# Patient Record
Sex: FEMALE | Race: WHITE | NOT HISPANIC OR LATINO | Employment: FULL TIME | URBAN - METROPOLITAN AREA
[De-identification: names, ages, dates, MRNs, and addresses within clinical notes are randomized per-mention and may not be internally consistent; named-entity substitution may affect disease eponyms.]

---

## 2019-01-08 ENCOUNTER — HOSPITAL ENCOUNTER (EMERGENCY)
Facility: HOSPITAL | Age: 24
Discharge: HOME | End: 2019-01-08
Attending: EMERGENCY MEDICINE
Payer: COMMERCIAL

## 2019-01-08 ENCOUNTER — APPOINTMENT (EMERGENCY)
Dept: RADIOLOGY | Facility: HOSPITAL | Age: 24
End: 2019-01-08
Payer: COMMERCIAL

## 2019-01-08 VITALS
RESPIRATION RATE: 18 BRPM | TEMPERATURE: 98.8 F | OXYGEN SATURATION: 99 % | SYSTOLIC BLOOD PRESSURE: 124 MMHG | DIASTOLIC BLOOD PRESSURE: 79 MMHG | HEART RATE: 72 BPM

## 2019-01-08 DIAGNOSIS — R11.2 NAUSEA & VOMITING: ICD-10-CM

## 2019-01-08 DIAGNOSIS — R10.13 EPIGASTRIC PAIN: Primary | ICD-10-CM

## 2019-01-08 LAB
ALBUMIN SERPL BCP-MCNC: 3.9 G/DL (ref 3.5–5)
ALP SERPL-CCNC: 43 U/L (ref 46–116)
ALT SERPL W P-5'-P-CCNC: 25 U/L (ref 12–78)
ANION GAP SERPL CALCULATED.3IONS-SCNC: 10 MMOL/L (ref 4–13)
AST SERPL W P-5'-P-CCNC: 35 U/L (ref 5–45)
ATRIAL RATE: 60 BPM
BASOPHILS # BLD AUTO: 0.04 THOUSANDS/ΜL (ref 0–0.1)
BASOPHILS NFR BLD AUTO: 1 % (ref 0–1)
BILIRUB SERPL-MCNC: 0.4 MG/DL (ref 0.2–1)
BUN SERPL-MCNC: 10 MG/DL (ref 5–25)
CALCIUM SERPL-MCNC: 8.4 MG/DL (ref 8.3–10.1)
CHLORIDE SERPL-SCNC: 105 MMOL/L (ref 100–108)
CO2 SERPL-SCNC: 24 MMOL/L (ref 21–32)
CREAT SERPL-MCNC: 0.74 MG/DL (ref 0.6–1.3)
DEPRECATED D DIMER PPP: <270 NG/ML (FEU)
EOSINOPHIL # BLD AUTO: 0.04 THOUSAND/ΜL (ref 0–0.61)
EOSINOPHIL NFR BLD AUTO: 1 % (ref 0–6)
ERYTHROCYTE [DISTWIDTH] IN BLOOD BY AUTOMATED COUNT: 12.5 % (ref 11.6–15.1)
GFR SERPL CREATININE-BSD FRML MDRD: 113 ML/MIN/1.73SQ M
GLUCOSE SERPL-MCNC: 111 MG/DL (ref 65–140)
HCT VFR BLD AUTO: 38.3 % (ref 34.8–46.1)
HGB BLD-MCNC: 12.9 G/DL (ref 11.5–15.4)
IMM GRANULOCYTES # BLD AUTO: 0.01 THOUSAND/UL (ref 0–0.2)
IMM GRANULOCYTES NFR BLD AUTO: 0 % (ref 0–2)
LIPASE SERPL-CCNC: 88 U/L (ref 73–393)
LYMPHOCYTES # BLD AUTO: 2.15 THOUSANDS/ΜL (ref 0.6–4.47)
LYMPHOCYTES NFR BLD AUTO: 26 % (ref 14–44)
MCH RBC QN AUTO: 30.3 PG (ref 26.8–34.3)
MCHC RBC AUTO-ENTMCNC: 33.7 G/DL (ref 31.4–37.4)
MCV RBC AUTO: 90 FL (ref 82–98)
MONOCYTES # BLD AUTO: 0.61 THOUSAND/ΜL (ref 0.17–1.22)
MONOCYTES NFR BLD AUTO: 7 % (ref 4–12)
NEUTROPHILS # BLD AUTO: 5.51 THOUSANDS/ΜL (ref 1.85–7.62)
NEUTS SEG NFR BLD AUTO: 65 % (ref 43–75)
NRBC BLD AUTO-RTO: 0 /100 WBCS
P AXIS: -27 DEGREES
PLATELET # BLD AUTO: 330 THOUSANDS/UL (ref 149–390)
PMV BLD AUTO: 9.4 FL (ref 8.9–12.7)
POTASSIUM SERPL-SCNC: 4.8 MMOL/L (ref 3.5–5.3)
PR INTERVAL: 150 MS
PROT SERPL-MCNC: 7.2 G/DL (ref 6.4–8.2)
QRS AXIS: 83 DEGREES
QRSD INTERVAL: 76 MS
QT INTERVAL: 418 MS
QTC INTERVAL: 418 MS
RBC # BLD AUTO: 4.26 MILLION/UL (ref 3.81–5.12)
SODIUM SERPL-SCNC: 139 MMOL/L (ref 136–145)
T WAVE AXIS: 62 DEGREES
TROPONIN I SERPL-MCNC: <0.02 NG/ML
VENTRICULAR RATE: 60 BPM
WBC # BLD AUTO: 8.36 THOUSAND/UL (ref 4.31–10.16)

## 2019-01-08 PROCEDURE — 99285 EMERGENCY DEPT VISIT HI MDM: CPT

## 2019-01-08 PROCEDURE — 85025 COMPLETE CBC W/AUTO DIFF WBC: CPT | Performed by: EMERGENCY MEDICINE

## 2019-01-08 PROCEDURE — 93005 ELECTROCARDIOGRAM TRACING: CPT

## 2019-01-08 PROCEDURE — 93010 ELECTROCARDIOGRAM REPORT: CPT | Performed by: INTERNAL MEDICINE

## 2019-01-08 PROCEDURE — 83690 ASSAY OF LIPASE: CPT | Performed by: EMERGENCY MEDICINE

## 2019-01-08 PROCEDURE — 85379 FIBRIN DEGRADATION QUANT: CPT | Performed by: EMERGENCY MEDICINE

## 2019-01-08 PROCEDURE — 36415 COLL VENOUS BLD VENIPUNCTURE: CPT | Performed by: EMERGENCY MEDICINE

## 2019-01-08 PROCEDURE — 80053 COMPREHEN METABOLIC PANEL: CPT | Performed by: EMERGENCY MEDICINE

## 2019-01-08 PROCEDURE — 71046 X-RAY EXAM CHEST 2 VIEWS: CPT

## 2019-01-08 PROCEDURE — 84484 ASSAY OF TROPONIN QUANT: CPT | Performed by: EMERGENCY MEDICINE

## 2019-01-08 RX ORDER — MAGNESIUM HYDROXIDE/ALUMINUM HYDROXICE/SIMETHICONE 120; 1200; 1200 MG/30ML; MG/30ML; MG/30ML
30 SUSPENSION ORAL ONCE
Status: COMPLETED | OUTPATIENT
Start: 2019-01-08 | End: 2019-01-08

## 2019-01-08 RX ORDER — ONDANSETRON 4 MG/1
4 TABLET, ORALLY DISINTEGRATING ORAL EVERY 6 HOURS PRN
Qty: 10 TABLET | Refills: 0 | Status: SHIPPED | OUTPATIENT
Start: 2019-01-08 | End: 2020-10-28 | Stop reason: SDUPTHER

## 2019-01-08 RX ADMIN — LIDOCAINE HYDROCHLORIDE 10 ML: 20 SOLUTION ORAL; TOPICAL at 13:47

## 2019-01-08 RX ADMIN — ALUMINUM HYDROXIDE, MAGNESIUM HYDROXIDE, AND SIMETHICONE 30 ML: 200; 200; 20 SUSPENSION ORAL at 13:47

## 2019-01-08 NOTE — ED NOTES
Patient transported to [address] [place] [address], [address] [place] [address]  01/08/19 2466

## 2019-01-08 NOTE — DISCHARGE INSTRUCTIONS
Epigastric Pain   WHAT YOU NEED TO KNOW:   Epigastric pain is felt in the middle of the upper abdomen, between the ribs and the bellybutton  The pain may be mild or severe  Pain may spread from or to another part of your body  Epigastric pain may be a sign of a serious health problem that needs to be treated  DISCHARGE INSTRUCTIONS:   Call 911 for any of the following:   · You have any of the following signs of a heart attack:      ¨ Squeezing, pressure, or pain in your chest that lasts longer than 5 minutes or returns    ¨ Discomfort or pain in your back, neck, jaw, stomach, or arm     ¨ Trouble breathing    ¨ Nausea or vomiting    ¨ Lightheadedness or a sudden cold sweat, especially with chest pain or trouble breathing    · You have severe pain that radiates to your jaw or back  Return to the emergency department if:   · You have severe pain that starts suddenly and quickly gets worse  · You cannot have a bowel movement and are vomiting  · You vomit or cough up blood  · You see blood in your urine or bowel movement  · You feel drowsy and your breathing is slower than usual   Contact your healthcare provider if:   · You have a fever or chills  · You have yellowing of your skin or the whites of your eyes  · You vomit often or several times in a row  · You lose weight without trying  · You have symptoms for longer than 2 weeks  · You have questions or concerns about your condition or care  Medicines:   · Medicines  may be given to treat pain or stop vomiting  You may also need medicines to reduce or control stomach acid, or treat an infection  · Take your medicine as directed  Contact your healthcare provider if you think your medicine is not helping or if you have side effects  Tell him of her if you are allergic to any medicine  Keep a list of the medicines, vitamins, and herbs you take  Include the amounts, and when and why you take them   Bring the list or the pill bottles to follow-up visits  Carry your medicine list with you in case of an emergency  Follow up with your healthcare provider as directed:  Write down your questions so you remember to ask them during your visits  Manage your symptoms:   · Keep a record of your symptoms  Include when the pain starts, how long it lasts, and if it is sharp or dull  Also include any foods you ate or activities you did before the pain started  Keep track of anything that helped the pain  · Eat a variety of healthy foods  Healthy foods include fruits, vegetables, whole-grain breads, low-fat dairy products, beans, lean meats, and fish  Ask if you need to be on a special diet  Certain foods may cause your pain, such as alcohol or foods that are high in fat  You may need to eat smaller meals and to eat more often than usual     · Drink liquids as directed  Ask how much liquid to drink each day and which liquids are best for you  Do not have drinks that contain alcohol or caffeine  © 2017 [facility] Information is for End User's use only and may not be sold, redistributed or otherwise used for commercial purposes  All illustrations and images included in CareNotes® are the copyrighted property of [employer]  or [employer]  The above information is an  only  It is not intended as medical advice for individual conditions or treatments  Talk to your doctor, nurse or pharmacist before following any medical regimen to see if it is safe and effective for you

## 2020-03-18 ENCOUNTER — APPOINTMENT (OUTPATIENT)
Dept: RADIOLOGY | Facility: CLINIC | Age: 25
End: 2020-03-18
Payer: OTHER MISCELLANEOUS

## 2020-03-18 ENCOUNTER — TRANSCRIBE ORDERS (OUTPATIENT)
Dept: URGENT CARE | Facility: CLINIC | Age: 25
End: 2020-03-18

## 2020-03-18 DIAGNOSIS — S69.91XA INJURY OF RIGHT THUMB, INITIAL ENCOUNTER: ICD-10-CM

## 2020-03-18 DIAGNOSIS — S69.91XA INJURY OF RIGHT THUMB, INITIAL ENCOUNTER: Primary | ICD-10-CM

## 2020-03-18 PROCEDURE — 73140 X-RAY EXAM OF FINGER(S): CPT

## 2020-03-20 ENCOUNTER — OFFICE VISIT (OUTPATIENT)
Dept: OBGYN CLINIC | Facility: CLINIC | Age: 25
End: 2020-03-20
Payer: OTHER MISCELLANEOUS

## 2020-03-20 VITALS
DIASTOLIC BLOOD PRESSURE: 83 MMHG | HEIGHT: 66 IN | WEIGHT: 130 LBS | SYSTOLIC BLOOD PRESSURE: 117 MMHG | HEART RATE: 105 BPM | BODY MASS INDEX: 20.89 KG/M2

## 2020-03-20 DIAGNOSIS — S67.01XA CRUSHING INJURY OF RIGHT THUMB, INITIAL ENCOUNTER: Primary | ICD-10-CM

## 2020-03-20 PROCEDURE — 99203 OFFICE O/P NEW LOW 30 MIN: CPT | Performed by: ORTHOPAEDIC SURGERY

## 2020-03-20 NOTE — PROGRESS NOTES
Assessment/Plan:  1  Crushing injury of right thumb, initial encounter         Scribe Attestation    I,:   Christina Wood MA am acting as a scribe while in the presence of the attending physician :        I,:   Saint Buff, DO personally performed the services described in this documentation    as scribed in my presence :              I discussed with Yoly Beck that x-rays are negative for any fractures or dislocations  I explained to her she likely crushed the digital nerves during the injury  She may discontinue the use of the finger splint  She was instructed to work on ROM   She may take vitamin B 6 and b 12 to help with nerve regeneration  She may take Advil OTC as needed for pain  She was instructed to avoid any heavy lifting for the next 2 weeks  She may follow up with me as needed  If her pain is not improved in 4 weeks she may call the office for a follow up appointment  Subjective:   Russ Workman is a 30 y o  female who presents to the office today for evaluation of right thumb pain  Patient states she was at work on 3/18/20 when the [place] door closed on her right thumb  Patient notes pain and burning to the tip of her thumb  She also notes mild pain to the base of her thumb  She noted swelling after the injury  She has been using a finger splint  Review of Systems   Constitutional: Negative for chills and fever  HENT: Negative for drooling and sneezing  Eyes: Negative for redness  Respiratory: Negative for cough and wheezing  Gastrointestinal: Negative for nausea and vomiting  Musculoskeletal: Negative for arthralgias, joint swelling and myalgias  Neurological: Negative for weakness and numbness  Psychiatric/Behavioral: Negative for behavioral problems  The patient is not nervous/anxious  History reviewed  No pertinent past medical history  Past Surgical History:   Procedure Laterality Date    CERVICAL SPINE SURGERY         History reviewed   No pertinent family history  Social History     Occupational History    Not on file   Tobacco Use    Smoking status: Former Smoker    Smokeless tobacco: Never Used   Substance and Sexual Activity    Alcohol use: Yes     Comment: socially     Drug use: No    Sexual activity: Not on file         Current Outpatient Medications:     ondansetron (ZOFRAN-ODT) 4 mg disintegrating tablet, Take 1 tablet (4 mg total) by mouth every 6 (six) hours as needed for nausea or vomiting (Patient not taking: Reported on 3/20/2020), Disp: 10 tablet, Rfl: 0    Allergies   Allergen Reactions    Lidocaine Hives       Objective:  Vitals:    03/20/20 1055   BP: 117/83   Pulse: 105       Ortho Exam     Right thumb    NTTP UCL RCL MCP joint  EPL FPL intact  Mild swelling about the pulp  Compartments soft  Brisk capillary refill  S/m intact median, radial, and ulnar nerve     Physical Exam   Constitutional: She is oriented to person, place, and time  She appears well-developed and well-nourished  HENT:   Head: Normocephalic and atraumatic  Eyes: Conjunctivae are normal  Right eye exhibits no discharge  Left eye exhibits no discharge  Neck: Normal range of motion  Neck supple  Cardiovascular: Normal rate and intact distal pulses  Pulmonary/Chest: Effort normal  No respiratory distress  Musculoskeletal:   As noted in HPI   Neurological: She is alert and oriented to person, place, and time  Skin: Skin is warm and dry  Psychiatric: She has a normal mood and affect  Her behavior is normal  Judgment and thought content normal        I have personally reviewed pertinent films in PACS and my interpretation is as follows:X-ray right thumb performed on 3/18/20 demonstrates no fractures or dislocations

## 2020-04-12 ENCOUNTER — TELEPHONE (OUTPATIENT)
Dept: OTHER | Facility: OTHER | Age: 25
End: 2020-04-12

## 2020-04-13 ENCOUNTER — OFFICE VISIT (OUTPATIENT)
Dept: OBGYN CLINIC | Facility: CLINIC | Age: 25
End: 2020-04-13

## 2020-04-13 VITALS
BODY MASS INDEX: 21.69 KG/M2 | HEART RATE: 81 BPM | WEIGHT: 135 LBS | HEIGHT: 66 IN | SYSTOLIC BLOOD PRESSURE: 100 MMHG | DIASTOLIC BLOOD PRESSURE: 64 MMHG

## 2020-04-13 DIAGNOSIS — M76.62 ACHILLES TENDINITIS OF LEFT LOWER EXTREMITY: ICD-10-CM

## 2020-04-13 DIAGNOSIS — M76.72 PERONEAL TENDINITIS, LEFT: ICD-10-CM

## 2020-04-13 DIAGNOSIS — L03.119 CELLULITIS OF ANKLE: Primary | ICD-10-CM

## 2020-04-13 PROCEDURE — 99214 OFFICE O/P EST MOD 30 MIN: CPT | Performed by: ORTHOPAEDIC SURGERY

## 2020-04-13 RX ORDER — SULFAMETHOXAZOLE AND TRIMETHOPRIM 800; 160 MG/1; MG/1
1 TABLET ORAL EVERY 12 HOURS SCHEDULED
Qty: 20 TABLET | Refills: 0 | Status: SHIPPED | OUTPATIENT
Start: 2020-04-13 | End: 2020-04-14 | Stop reason: SINTOL

## 2020-04-13 RX ORDER — IBUPROFEN 600 MG/1
TABLET ORAL EVERY 6 HOURS PRN
COMMUNITY

## 2020-04-14 ENCOUNTER — TELEPHONE (OUTPATIENT)
Dept: OBGYN CLINIC | Facility: CLINIC | Age: 25
End: 2020-04-14

## 2020-04-14 DIAGNOSIS — L03.116 CELLULITIS OF LEFT LOWER EXTREMITY: Primary | ICD-10-CM

## 2020-04-14 RX ORDER — CEPHALEXIN 500 MG/1
500 CAPSULE ORAL EVERY 6 HOURS SCHEDULED
Qty: 28 CAPSULE | Refills: 0 | Status: SHIPPED | OUTPATIENT
Start: 2020-04-14 | End: 2020-04-21

## 2020-04-17 ENCOUNTER — OFFICE VISIT (OUTPATIENT)
Dept: OBGYN CLINIC | Facility: CLINIC | Age: 25
End: 2020-04-17

## 2020-04-17 VITALS — SYSTOLIC BLOOD PRESSURE: 111 MMHG | DIASTOLIC BLOOD PRESSURE: 71 MMHG

## 2020-04-17 DIAGNOSIS — M76.72 PERONEAL TENDINITIS, LEFT: ICD-10-CM

## 2020-04-17 DIAGNOSIS — L03.116 CELLULITIS OF LEFT LOWER EXTREMITY: Primary | ICD-10-CM

## 2020-04-17 DIAGNOSIS — M76.62 ACHILLES TENDINITIS OF LEFT LOWER EXTREMITY: ICD-10-CM

## 2020-04-17 PROCEDURE — 99213 OFFICE O/P EST LOW 20 MIN: CPT | Performed by: ORTHOPAEDIC SURGERY

## 2020-07-15 ENCOUNTER — HOSPITAL ENCOUNTER (EMERGENCY)
Facility: HOSPITAL | Age: 25
Discharge: HOME | End: 2020-07-15
Attending: EMERGENCY MEDICINE | Admitting: EMERGENCY MEDICINE
Payer: COMMERCIAL

## 2020-07-15 VITALS
HEART RATE: 84 BPM | DIASTOLIC BLOOD PRESSURE: 86 MMHG | OXYGEN SATURATION: 99 % | SYSTOLIC BLOOD PRESSURE: 121 MMHG | RESPIRATION RATE: 18 BRPM | TEMPERATURE: 99.4 F

## 2020-07-15 DIAGNOSIS — R51.9 ACUTE NONINTRACTABLE HEADACHE, UNSPECIFIED HEADACHE TYPE: ICD-10-CM

## 2020-07-15 DIAGNOSIS — R50.9 FEVER: Primary | ICD-10-CM

## 2020-07-15 LAB
ALBUMIN SERPL BCP-MCNC: 4.3 G/DL (ref 3.4–4.8)
ALP SERPL-CCNC: 54.7 U/L (ref 35–140)
ALT SERPL W P-5'-P-CCNC: 13 U/L (ref 5–54)
ANION GAP SERPL CALCULATED.3IONS-SCNC: 8 MMOL/L (ref 4–13)
AST SERPL W P-5'-P-CCNC: 16 U/L (ref 15–41)
BASOPHILS # BLD AUTO: 0.07 THOUSANDS/ΜL (ref 0–0.1)
BASOPHILS NFR BLD AUTO: 1 % (ref 0–1)
BILIRUB SERPL-MCNC: 0.28 MG/DL (ref 0.3–1.2)
BILIRUB UR QL STRIP: NEGATIVE
BUN SERPL-MCNC: 9 MG/DL (ref 6–20)
CALCIUM SERPL-MCNC: 9.1 MG/DL (ref 8.4–10.2)
CHLORIDE SERPL-SCNC: 106 MMOL/L (ref 96–108)
CLARITY UR: CLEAR
CO2 SERPL-SCNC: 25 MMOL/L (ref 22–33)
COLOR UR: YELLOW
CREAT SERPL-MCNC: 0.77 MG/DL (ref 0.4–1.1)
EOSINOPHIL # BLD AUTO: 0.11 THOUSAND/ΜL (ref 0–0.61)
EOSINOPHIL NFR BLD AUTO: 1 % (ref 0–6)
ERYTHROCYTE [DISTWIDTH] IN BLOOD BY AUTOMATED COUNT: 12.9 % (ref 11.6–15.1)
EXT PREG TEST URINE: NEGATIVE
EXT. CONTROL ED NAV: NORMAL
GFR SERPL CREATININE-BSD FRML MDRD: 106 ML/MIN/1.73SQ M
GLUCOSE SERPL-MCNC: 107 MG/DL (ref 65–140)
GLUCOSE UR STRIP-MCNC: NEGATIVE MG/DL
HCT VFR BLD AUTO: 41.6 % (ref 34.8–46.1)
HGB BLD-MCNC: 14.8 G/DL (ref 11.5–15.4)
HGB UR QL STRIP.AUTO: NEGATIVE
IMM GRANULOCYTES # BLD AUTO: 0.01 THOUSAND/UL (ref 0–0.2)
IMM GRANULOCYTES NFR BLD AUTO: 0 % (ref 0–2)
KETONES UR STRIP-MCNC: NEGATIVE MG/DL
LEUKOCYTE ESTERASE UR QL STRIP: NEGATIVE
LYMPHOCYTES # BLD AUTO: 2.55 THOUSANDS/ΜL (ref 0.6–4.47)
LYMPHOCYTES NFR BLD AUTO: 26 % (ref 14–44)
MCH RBC QN AUTO: 30.6 PG (ref 26.8–34.3)
MCHC RBC AUTO-ENTMCNC: 35.6 G/DL (ref 31.4–37.4)
MCV RBC AUTO: 86 FL (ref 82–98)
MONOCYTES # BLD AUTO: 0.72 THOUSAND/ΜL (ref 0.17–1.22)
MONOCYTES NFR BLD AUTO: 7 % (ref 4–12)
NEUTROPHILS # BLD AUTO: 6.42 THOUSANDS/ΜL (ref 1.85–7.62)
NEUTS SEG NFR BLD AUTO: 65 % (ref 43–75)
NITRITE UR QL STRIP: NEGATIVE
PH UR STRIP.AUTO: 6.5 [PH]
PLATELET # BLD AUTO: 313 THOUSANDS/UL (ref 149–390)
PMV BLD AUTO: 9.7 FL (ref 8.9–12.7)
POTASSIUM SERPL-SCNC: 3.8 MMOL/L (ref 3.5–5)
PROT SERPL-MCNC: 6.8 G/DL (ref 6.4–8.3)
PROT UR STRIP-MCNC: NEGATIVE MG/DL
RBC # BLD AUTO: 4.83 MILLION/UL (ref 3.81–5.12)
SODIUM SERPL-SCNC: 139 MMOL/L (ref 133–145)
SP GR UR STRIP.AUTO: 1.01 (ref 1–1.03)
UROBILINOGEN UR QL STRIP.AUTO: 0.2 E.U./DL
WBC # BLD AUTO: 9.88 THOUSAND/UL (ref 4.31–10.16)

## 2020-07-15 PROCEDURE — 36415 COLL VENOUS BLD VENIPUNCTURE: CPT | Performed by: PHYSICIAN ASSISTANT

## 2020-07-15 PROCEDURE — 99284 EMERGENCY DEPT VISIT MOD MDM: CPT | Performed by: PHYSICIAN ASSISTANT

## 2020-07-15 PROCEDURE — 80053 COMPREHEN METABOLIC PANEL: CPT | Performed by: PHYSICIAN ASSISTANT

## 2020-07-15 PROCEDURE — 86618 LYME DISEASE ANTIBODY: CPT | Performed by: PHYSICIAN ASSISTANT

## 2020-07-15 PROCEDURE — 99283 EMERGENCY DEPT VISIT LOW MDM: CPT

## 2020-07-15 PROCEDURE — 85025 COMPLETE CBC W/AUTO DIFF WBC: CPT | Performed by: PHYSICIAN ASSISTANT

## 2020-07-15 PROCEDURE — 81003 URINALYSIS AUTO W/O SCOPE: CPT | Performed by: PHYSICIAN ASSISTANT

## 2020-07-15 PROCEDURE — 81025 URINE PREGNANCY TEST: CPT | Performed by: PHYSICIAN ASSISTANT

## 2020-07-15 PROCEDURE — 86308 HETEROPHILE ANTIBODY SCREEN: CPT | Performed by: PHYSICIAN ASSISTANT

## 2020-07-15 RX ORDER — PHENOL 1.4 %
20 AEROSOL, SPRAY (ML) MUCOUS MEMBRANE
COMMUNITY

## 2020-07-15 RX ORDER — BUTALBITAL, ACETAMINOPHEN AND CAFFEINE 50; 325; 40 MG/1; MG/1; MG/1
1 TABLET ORAL EVERY 4 HOURS PRN
Qty: 20 TABLET | Refills: 0 | Status: SHIPPED | OUTPATIENT
Start: 2020-07-15 | End: 2021-07-27

## 2020-07-15 NOTE — ED PROVIDER NOTES
History  Chief Complaint   Patient presents with    Headache     pt c/o headache with fatigue, sob onset Saturday     Patient presents to ED c/o  5 day history of low-grade fever T-max 101+° when symptoms began, but none since, complaining of a headache, generalized fatigue, malaise, 'just not feeling well", patient was tested for COVID few days ago,  results were negative  Patient states she return to her doctor, who recommended that she follow-up in emergency department since covid  test results are negative and she was having symptoms          Prior to Admission Medications   Prescriptions Last Dose Informant Patient Reported? Taking? Melatonin 10 MG TABS 7/14/2020 at Unknown time  Yes Yes   Sig: Take 20 mg by mouth daily at bedtime   ibuprofen (MOTRIN) 600 mg tablet Not Taking at Unknown time  Yes No   Sig: Take by mouth every 6 (six) hours as needed for mild pain   ondansetron (ZOFRAN-ODT) 4 mg disintegrating tablet Not Taking at Unknown time  No No   Sig: Take 1 tablet (4 mg total) by mouth every 6 (six) hours as needed for nausea or vomiting   Patient not taking: Reported on 3/20/2020      Facility-Administered Medications: None       History reviewed  No pertinent past medical history  Past Surgical History:   Procedure Laterality Date    CERVICAL SPINE SURGERY      SHOULDER ARTHROSCOPY Left 2014       No family history on file  I have reviewed and agree with the history as documented  E-Cigarette/Vaping    E-Cigarette Use Current Every Day User      E-Cigarette/Vaping Substances    Nicotine No     THC No     CBD No     Flavoring No     Other No     Unknown No      Social History     Tobacco Use    Smoking status: Current Every Day Smoker    Smokeless tobacco: Never Used   Substance Use Topics    Alcohol use: Yes     Comment: socially     Drug use: Yes     Types: Marijuana       Review of Systems   Constitutional: Positive for chills, fatigue and fever     HENT: Negative for ear pain, hearing loss and sore throat  Eyes: Negative for visual disturbance  Respiratory: Positive for shortness of breath  Negative for cough  Cardiovascular: Negative for chest pain and leg swelling  Gastrointestinal: Negative for abdominal pain and vomiting  Genitourinary: Negative for dysuria and frequency  Musculoskeletal: Negative for arthralgias, joint swelling and myalgias  Skin: Negative for color change, pallor and rash  Neurological: Positive for weakness and headaches  Negative for dizziness, light-headedness and numbness  Psychiatric/Behavioral: Negative for behavioral problems  All other systems reviewed and are negative  Physical Exam  Physical Exam   Constitutional: She is oriented to person, place, and time  She appears well-developed and well-nourished  No distress  HENT:   Head: Normocephalic and atraumatic  Nose: Nose normal    Mouth/Throat: Oropharynx is clear and moist    Eyes: Pupils are equal, round, and reactive to light  Conjunctivae are normal    Neck: Normal range of motion  Cardiovascular: Normal rate, regular rhythm and normal heart sounds  Pulmonary/Chest: Effort normal and breath sounds normal  No respiratory distress  Abdominal: Soft  Bowel sounds are normal  There is no tenderness  Musculoskeletal: Normal range of motion  Lymphadenopathy:     She has no cervical adenopathy  Neurological: She is alert and oriented to person, place, and time  Skin: Skin is warm and dry  Psychiatric: She has a normal mood and affect  Her behavior is normal    Nursing note and vitals reviewed        Vital Signs  ED Triage Vitals [07/15/20 1732]   Temperature Pulse Respirations Blood Pressure SpO2   99 4 °F (37 4 °C) 84 18 121/86 99 %      Temp Source Heart Rate Source Patient Position - Orthostatic VS BP Location FiO2 (%)   Tympanic -- -- -- --      Pain Score       5           Vitals:    07/15/20 1732   BP: 121/86   Pulse: 84         Visual Acuity      ED Medications  Medications - No data to display    Diagnostic Studies  Results Reviewed     Procedure Component Value Units Date/Time    Comprehensive metabolic panel [[id number]]  (Abnormal) Collected:  07/15/20 1820    Lab Status:  Final result Specimen:  Blood from Arm, Left Updated:  07/15/20 1844     Sodium 139 mmol/L      Potassium 3 8 mmol/L      Chloride 106 mmol/L      CO2 25 mmol/L      ANION GAP 8 mmol/L      BUN 9 mg/dL      Creatinine 0 77 mg/dL      Glucose 107 mg/dL      Calcium 9 1 mg/dL      AST 16 U/L      ALT 13 U/L      Alkaline Phosphatase 54 7 U/L      Total Protein 6 8 g/dL      Albumin 4 3 g/dL      Total Bilirubin 0 28 mg/dL      eGFR 106 ml/min/1 73sq m     Narrative:       National Kidney Disease Foundation guidelines for Chronic Kidney Disease (CKD):     Stage 1 with normal or high GFR (GFR > 90 mL/min/1 73 square meters)    Stage 2 Mild CKD (GFR = 60-89 mL/min/1 73 square meters)    Stage 3A Moderate CKD (GFR = 45-59 mL/min/1 73 square meters)    Stage 3B Moderate CKD (GFR = 30-44 mL/min/1 73 square meters)    Stage 4 Severe CKD (GFR = 15-29 mL/min/1 73 square meters)    Stage 5 End Stage CKD (GFR <15 mL/min/1 73 square meters)  Note: GFR calculation is accurate only with a steady state creatinine    UA w Reflex to Microscopic w Reflex to Culture [[id number]]  (Normal) Collected:  07/15/20 1821    Lab Status:  Final result Specimen:  Urine, Clean Catch Updated:  07/15/20 1832     Color, UA Yellow     Clarity, UA Clear     Specific Gravity, UA 1 010     pH, UA 6 5     Leukocytes, UA Negative     Nitrite, UA Negative     Protein, UA Negative mg/dl      Glucose, UA Negative mg/dl      Ketones, UA Negative mg/dl      Urobilinogen, UA 0 2 E U /dl      Bilirubin, UA Negative     Blood, UA Negative    CBC and differential [[id number]]  (Normal) Collected:  07/15/20 1820    Lab Status:  Final result Specimen:  Blood from Arm, Left Updated:  07/15/20 1827     WBC 9 88 Thousand/uL      RBC 4 83 Million/uL      Hemoglobin 14 8 g/dL      Hematocrit 41 6 %      MCV 86 fL      MCH 30 6 pg      MCHC 35 6 g/dL      RDW 12 9 %      MPV 9 7 fL      Platelets 049 Thousands/uL      Neutrophils Relative 65 %      Immat GRANS % 0 %      Lymphocytes Relative 26 %      Monocytes Relative 7 %      Eosinophils Relative 1 %      Basophils Relative 1 %      Neutrophils Absolute 6 42 Thousands/µL      Immature Grans Absolute 0 01 Thousand/uL      Lymphocytes Absolute 2 55 Thousands/µL      Monocytes Absolute 0 72 Thousand/µL      Eosinophils Absolute 0 11 Thousand/µL      Basophils Absolute 0 07 Thousands/µL     POCT pregnancy, urine [[id number]]  (Normal) Resulted:  07/15/20 1826    Lab Status:  Final result Updated:  07/15/20 1826     EXT PREG TEST UR (Ref: Negative) Negative     Control Valid    Mononucleosis screen [[id number]] Collected:  07/15/20 1820    Lab Status: In process Specimen:  Blood from Arm, Left Updated:  07/15/20 1824    Lyme Antibody Profile with reflex to [facility] [[id number]] Collected:  07/15/20 1820    Lab Status: In process Specimen:  Blood from Arm, Left Updated:  07/15/20 1824                 No orders to display              Procedures  Procedures         ED Course                                             MDM  Number of Diagnoses or Management Options  Diagnosis management comments: Offered NSAIDS to pt in the  emergency department, states she didn't want, she'll take at home, has been with little improvement,  will give her prescription for Fioricet  Mono and Lyme test pending will wait for results in order to treat    Patient has good follow-up with her doctor       Amount and/or Complexity of Data Reviewed  Clinical lab tests: reviewed          Disposition  Final diagnoses:   Fever   Acute nonintractable headache, unspecified headache type     Time reflects when diagnosis was documented in both MDM as applicable and the Disposition within this note     Time User Action Codes Description Comment 7/15/2020  7:09 PM Gwynda Soto Add [R50 9] Fever     7/15/2020  7:09 PM Gwynda Soto Add [R51] Acute nonintractable headache, unspecified headache type       ED Disposition     ED Disposition Condition Date/Time Comment    Discharge Stable Wed Jul 15, 2020  7:07 PM Nikita Rojas discharge to home/self care  Follow-up Information     Follow up With Specialties Details Why Lior Coles MD   As needed [facility] [address] [address]  [place] [address]  [phone]            Patient's Medications   Discharge Prescriptions    BUTALBITAL-ACETAMINOPHEN-CAFFEINE (FIORICET,ESGIC) -40 MG PER TABLET    Take 1 tablet by mouth every 4 (four) hours as needed for headaches       Start Date: 7/15/2020 End Date: --       Order Dose: 1 tablet       Quantity: 20 tablet    Refills: 0     No discharge procedures on file      PDMP Review     None          ED Provider  Electronically Signed by           Aidee Thompson PA-C  07/15/20 3986

## 2020-07-15 NOTE — DISCHARGE INSTRUCTIONS
Use Tylenol every 4 hours or Motrin every 6 hours; you can alternate the 2 medications taking something every 3 hours for pain or fever; if no improvement headache you can add Fioricet

## 2020-07-16 LAB — HETEROPH AB SER QL: NEGATIVE

## 2020-07-17 LAB — B BURGDOR IGG+IGM SER-ACNC: <0.91 ISR (ref 0–0.9)

## 2020-07-20 ENCOUNTER — TELEPHONE (OUTPATIENT)
Dept: PHYSICAL THERAPY | Facility: OTHER | Age: 25
End: 2020-07-20

## 2020-07-20 NOTE — TELEPHONE ENCOUNTER
Nurse reached out to discuss form filled out on-line by patient and  Comprehensive spine program offerings  Patient has hx of back sx, but stated she does not have records  Reviewed triage process with her and patient stated she was interested in evaluation to help establish care/move forward with treatment  Nurse had computer issues and asked if she could call right back after fixing  Patient agreed, but stated she was currently at work and to [facility] with contact information for [facility]  Agreed  Laptop issue resolved and nurse called patient w/i 5 minutes  Voice mail/message left for patient to return call to [place] [address] program including our hours of business and phone number  Reminded CB will come from a non- number

## 2020-07-21 ENCOUNTER — TELEPHONE (OUTPATIENT)
Dept: PHYSICAL THERAPY | Facility: OTHER | Age: 25
End: 2020-07-21

## 2020-10-28 ENCOUNTER — HOSPITAL ENCOUNTER (EMERGENCY)
Facility: HOSPITAL | Age: 25
Discharge: HOME | End: 2020-10-28
Attending: EMERGENCY MEDICINE
Payer: COMMERCIAL

## 2020-10-28 VITALS
WEIGHT: 138.23 LBS | RESPIRATION RATE: 18 BRPM | SYSTOLIC BLOOD PRESSURE: 129 MMHG | DIASTOLIC BLOOD PRESSURE: 79 MMHG | TEMPERATURE: 98.4 F | OXYGEN SATURATION: 100 % | HEART RATE: 73 BPM | BODY MASS INDEX: 22.31 KG/M2

## 2020-10-28 DIAGNOSIS — Z20.822 ENCOUNTER FOR LABORATORY TESTING FOR COVID-19 VIRUS: ICD-10-CM

## 2020-10-28 DIAGNOSIS — M79.10 MYALGIA: ICD-10-CM

## 2020-10-28 DIAGNOSIS — R50.9 FEVER: Primary | ICD-10-CM

## 2020-10-28 PROCEDURE — U0003 INFECTIOUS AGENT DETECTION BY NUCLEIC ACID (DNA OR RNA); SEVERE ACUTE RESPIRATORY SYNDROME CORONAVIRUS 2 (SARS-COV-2) (CORONAVIRUS DISEASE [COVID-19]), AMPLIFIED PROBE TECHNIQUE, MAKING USE OF HIGH THROUGHPUT TECHNOLOGIES AS DESCRIBED BY CMS-2020-01-R: HCPCS | Performed by: EMERGENCY MEDICINE

## 2020-10-28 PROCEDURE — 99284 EMERGENCY DEPT VISIT MOD MDM: CPT | Performed by: EMERGENCY MEDICINE

## 2020-10-28 PROCEDURE — 99283 EMERGENCY DEPT VISIT LOW MDM: CPT

## 2020-10-28 RX ORDER — ONDANSETRON 4 MG/1
4 TABLET, ORALLY DISINTEGRATING ORAL EVERY 8 HOURS PRN
Qty: 20 TABLET | Refills: 0 | Status: SHIPPED | OUTPATIENT
Start: 2020-10-28 | End: 2021-07-27

## 2020-10-30 LAB — SARS-COV-2 RNA SPEC QL NAA+PROBE: NOT DETECTED

## 2021-05-11 ENCOUNTER — APPOINTMENT (EMERGENCY)
Dept: RADIOLOGY | Facility: HOSPITAL | Age: 26
End: 2021-05-11
Payer: COMMERCIAL

## 2021-05-11 ENCOUNTER — HOSPITAL ENCOUNTER (EMERGENCY)
Facility: HOSPITAL | Age: 26
Discharge: HOME | End: 2021-05-11
Attending: EMERGENCY MEDICINE | Admitting: EMERGENCY MEDICINE
Payer: COMMERCIAL

## 2021-05-11 VITALS
OXYGEN SATURATION: 99 % | TEMPERATURE: 98.5 F | DIASTOLIC BLOOD PRESSURE: 79 MMHG | RESPIRATION RATE: 14 BRPM | SYSTOLIC BLOOD PRESSURE: 122 MMHG | HEART RATE: 80 BPM

## 2021-05-11 DIAGNOSIS — B34.9 VIRAL ILLNESS: Primary | ICD-10-CM

## 2021-05-11 LAB
ALBUMIN SERPL BCP-MCNC: 4.3 G/DL (ref 3.4–4.8)
ALP SERPL-CCNC: 49.9 U/L (ref 35–140)
ALT SERPL W P-5'-P-CCNC: 12 U/L (ref 5–54)
ANION GAP SERPL CALCULATED.3IONS-SCNC: 7 MMOL/L (ref 4–13)
AST SERPL W P-5'-P-CCNC: 18 U/L (ref 15–41)
BASOPHILS # BLD AUTO: 0.03 THOUSANDS/ΜL (ref 0–0.1)
BASOPHILS NFR BLD AUTO: 0 % (ref 0–1)
BILIRUB SERPL-MCNC: 0.53 MG/DL (ref 0.3–1.2)
BUN SERPL-MCNC: 6 MG/DL (ref 6–20)
CALCIUM SERPL-MCNC: 9 MG/DL (ref 8.4–10.2)
CHLORIDE SERPL-SCNC: 106 MMOL/L (ref 96–108)
CO2 SERPL-SCNC: 27 MMOL/L (ref 22–33)
CREAT SERPL-MCNC: 0.7 MG/DL (ref 0.4–1.1)
EOSINOPHIL # BLD AUTO: 0.04 THOUSAND/ΜL (ref 0–0.61)
EOSINOPHIL NFR BLD AUTO: 0 % (ref 0–6)
ERYTHROCYTE [DISTWIDTH] IN BLOOD BY AUTOMATED COUNT: 12.3 % (ref 11.6–15.1)
GFR SERPL CREATININE-BSD FRML MDRD: 119 ML/MIN/1.73SQ M
GLUCOSE SERPL-MCNC: 92 MG/DL (ref 65–140)
HCT VFR BLD AUTO: 38.7 % (ref 34.8–46.1)
HGB BLD-MCNC: 13.3 G/DL (ref 11.5–15.4)
IMM GRANULOCYTES # BLD AUTO: 0.01 THOUSAND/UL (ref 0–0.2)
IMM GRANULOCYTES NFR BLD AUTO: 0 % (ref 0–2)
LYMPHOCYTES # BLD AUTO: 3.21 THOUSANDS/ΜL (ref 0.6–4.47)
LYMPHOCYTES NFR BLD AUTO: 34 % (ref 14–44)
MCH RBC QN AUTO: 30.5 PG (ref 26.8–34.3)
MCHC RBC AUTO-ENTMCNC: 34.4 G/DL (ref 31.4–37.4)
MCV RBC AUTO: 89 FL (ref 82–98)
MONOCYTES # BLD AUTO: 0.52 THOUSAND/ΜL (ref 0.17–1.22)
MONOCYTES NFR BLD AUTO: 6 % (ref 4–12)
NEUTROPHILS # BLD AUTO: 5.72 THOUSANDS/ΜL (ref 1.85–7.62)
NEUTS SEG NFR BLD AUTO: 60 % (ref 43–75)
PLATELET # BLD AUTO: 283 THOUSANDS/UL (ref 149–390)
PMV BLD AUTO: 9.4 FL (ref 8.9–12.7)
POTASSIUM SERPL-SCNC: 3.8 MMOL/L (ref 3.5–5)
PROT SERPL-MCNC: 6.7 G/DL (ref 6.4–8.3)
RBC # BLD AUTO: 4.36 MILLION/UL (ref 3.81–5.12)
SARS-COV-2 RNA RESP QL NAA+PROBE: NEGATIVE
SODIUM SERPL-SCNC: 140 MMOL/L (ref 133–145)
TSH SERPL DL<=0.05 MIU/L-ACNC: 0.71 UIU/ML (ref 0.34–5.6)
WBC # BLD AUTO: 9.53 THOUSAND/UL (ref 4.31–10.16)

## 2021-05-11 PROCEDURE — 80053 COMPREHEN METABOLIC PANEL: CPT | Performed by: PHYSICIAN ASSISTANT

## 2021-05-11 PROCEDURE — 99284 EMERGENCY DEPT VISIT MOD MDM: CPT

## 2021-05-11 PROCEDURE — 87635 SARS-COV-2 COVID-19 AMP PRB: CPT | Performed by: PHYSICIAN ASSISTANT

## 2021-05-11 PROCEDURE — 85025 COMPLETE CBC W/AUTO DIFF WBC: CPT | Performed by: PHYSICIAN ASSISTANT

## 2021-05-11 PROCEDURE — 36415 COLL VENOUS BLD VENIPUNCTURE: CPT | Performed by: PHYSICIAN ASSISTANT

## 2021-05-11 PROCEDURE — 99284 EMERGENCY DEPT VISIT MOD MDM: CPT | Performed by: PHYSICIAN ASSISTANT

## 2021-05-11 PROCEDURE — 71045 X-RAY EXAM CHEST 1 VIEW: CPT

## 2021-05-11 PROCEDURE — 84443 ASSAY THYROID STIM HORMONE: CPT | Performed by: PHYSICIAN ASSISTANT

## 2021-05-11 RX ORDER — IBUPROFEN 600 MG/1
600 TABLET ORAL ONCE
Status: COMPLETED | OUTPATIENT
Start: 2021-05-11 | End: 2021-05-11

## 2021-05-11 RX ADMIN — IBUPROFEN 600 MG: 600 TABLET, FILM COATED ORAL at 18:17

## 2021-05-11 NOTE — ED PROVIDER NOTES
History  Chief Complaint   Patient presents with    Flu Symptoms     patient having cough, and fevers, was tested for covid at an urgent care, and the specimen was lost  patient also complains of chest tightness      Pt with no PMH, PSH:  Cervical spine surgery, shoulder arthroscopy  Presents to emergency department complaining of few day h/o congestion, dry cough, fevers, today 101, afebrile here in ED, states she took Tylenol PTA, generalized malaise, tired, myalgias, just not feeling well  Pt was tested for COVID at an urgent care, but the specimen was lost          Prior to Admission Medications   Prescriptions Last Dose Informant Patient Reported? Taking? Melatonin 10 MG TABS   Yes No   Sig: Take 20 mg by mouth daily at bedtime   butalbital-acetaminophen-caffeine (FIORICET,ESGIC) -40 mg per tablet   No No   Sig: Take 1 tablet by mouth every 4 (four) hours as needed for headaches   ibuprofen (MOTRIN) 600 mg tablet   Yes No   Sig: Take by mouth every 6 (six) hours as needed for mild pain   ondansetron (ZOFRAN-ODT) 4 mg disintegrating tablet   No No   Sig: Take 1 tablet (4 mg total) by mouth every 8 (eight) hours as needed for nausea or vomiting for up to 5 days      Facility-Administered Medications: None       History reviewed  No pertinent past medical history  Past Surgical History:   Procedure Laterality Date    CERVICAL SPINE SURGERY      SHOULDER ARTHROSCOPY Left 2014       History reviewed  No pertinent family history  I have reviewed and agree with the history as documented      E-Cigarette/Vaping    E-Cigarette Use Current Every Day User      E-Cigarette/Vaping Substances    Nicotine No     THC No     CBD No     Flavoring No     Other No     Unknown No      Social History     Tobacco Use    Smoking status: Current Every Day Smoker    Smokeless tobacco: Never Used   Substance Use Topics    Alcohol use: Yes     Comment: socially     Drug use: Yes     Types: Marijuana       Review of Systems   Constitutional: Positive for chills, fatigue and fever  HENT: Positive for congestion  Negative for hearing loss, mouth sores, sore throat and trouble swallowing  Eyes: Negative for visual disturbance  Respiratory: Positive for cough  Negative for shortness of breath  Cardiovascular: Negative for chest pain and leg swelling  Gastrointestinal: Negative for abdominal pain, diarrhea, nausea and vomiting  Genitourinary: Negative for dysuria, flank pain, frequency and genital sores  Musculoskeletal: Positive for myalgias  Negative for arthralgias and gait problem  Skin: Negative for color change and pallor  Neurological: Positive for weakness and light-headedness  Negative for dizziness and headaches  Psychiatric/Behavioral: Negative for behavioral problems  All other systems reviewed and are negative  Physical Exam  Physical Exam  Vitals signs and nursing note reviewed  Constitutional:       General: She is in acute distress  Appearance: Normal appearance  She is well-developed  HENT:      Head: Normocephalic and atraumatic  Right Ear: External ear normal       Left Ear: External ear normal       Nose: Congestion present  Mouth/Throat:      Mouth: Mucous membranes are moist       Pharynx: Oropharynx is clear  No oropharyngeal exudate or posterior oropharyngeal erythema  Eyes:      Conjunctiva/sclera: Conjunctivae normal    Neck:      Musculoskeletal: Normal range of motion  Cardiovascular:      Rate and Rhythm: Normal rate and regular rhythm  Pulmonary:      Effort: Pulmonary effort is normal  No respiratory distress  Breath sounds: Normal breath sounds  No wheezing  Abdominal:      General: Bowel sounds are normal       Palpations: Abdomen is soft  Tenderness: There is no right CVA tenderness or left CVA tenderness  Musculoskeletal: Normal range of motion  Lymphadenopathy:      Cervical: No cervical adenopathy     Skin:     General: Skin is warm and dry  Capillary Refill: Capillary refill takes less than 2 seconds  Findings: No rash  Neurological:      General: No focal deficit present  Mental Status: She is alert and oriented to person, place, and time  Motor: No weakness  Psychiatric:         Behavior: Behavior normal          Vital Signs  ED Triage Vitals   Temperature Pulse Respirations Blood Pressure SpO2   05/11/21 1743 05/11/21 1743 05/11/21 1743 05/11/21 1743 05/11/21 1743   98 5 °F (36 9 °C) 80 14 122/79 99 %      Temp Source Heart Rate Source Patient Position - Orthostatic VS BP Location FiO2 (%)   05/11/21 1743 05/11/21 1743 05/11/21 1743 05/11/21 1743 --   Oral Monitor Lying Right arm       Pain Score       05/11/21 1817       Worst Possible Pain           Vitals:    05/11/21 1743   BP: 122/79   Pulse: 80   Patient Position - Orthostatic VS: Lying         Visual Acuity      ED Medications  Medications   ibuprofen (MOTRIN) tablet 600 mg (600 mg Oral Given 5/11/21 1817)       Diagnostic Studies  Results Reviewed     Procedure Component Value Units Date/Time    Novel Coronavirus (Covid-19),PCR [facility] - 2 Hour Stat [[id number]]  (Normal) Collected: 05/11/21 1810    Lab Status: Final result Specimen: Nares from Nasopharyngeal Swab Updated: 05/11/21 1916     SARS-CoV-2 Negative    Narrative: The specimen collection materials, transport medium, and/or testing methodology utilized in the production of these test results have been proven to be reliable in a limited validation with an abbreviated program under the Emergency Utilization Authorization provided by the FDA  Testing reported as "Presumptive positive" will be confirmed with secondary testing to ensure result accuracy  Clinical caution and judgement should be used with the interpretation of these results with consideration of the clinical impression and other laboratory testing    Testing reported as "Positive" or "Negative" has been proven to be accurate according to standard laboratory validation requirements  All testing is performed with control materials showing appropriate reactivity at standard intervals  TSH [[id number]]  (Normal) Collected: 05/11/21 1814    Lab Status: Final result Specimen: Blood from Arm, Right Updated: 05/11/21 1903     TSH 3RD GENERATON 0 710 uIU/mL     Narrative:      Patients undergoing fluorescein dye angiography may retain small amounts of fluorescein in the body for 48-72 hours post procedure  Samples containing fluorescein can produce falsely depressed TSH values  If the patient had this procedure,a specimen should be resubmitted post fluorescein clearance        Comprehensive metabolic panel [[id number]] Collected: 05/11/21 1814    Lab Status: Final result Specimen: Blood from Arm, Right Updated: 05/11/21 1849     Sodium 140 mmol/L      Potassium 3 8 mmol/L      Chloride 106 mmol/L      CO2 27 mmol/L      ANION GAP 7 mmol/L      BUN 6 mg/dL      Creatinine 0 70 mg/dL      Glucose 92 mg/dL      Calcium 9 0 mg/dL      AST 18 U/L      ALT 12 U/L      Alkaline Phosphatase 49 9 U/L      Total Protein 6 7 g/dL      Albumin 4 3 g/dL      Total Bilirubin 0 53 mg/dL      eGFR 119 ml/min/1 73sq m     Narrative:      [facility] guidelines for Chronic Kidney Disease (CKD):     Stage 1 with normal or high GFR (GFR > 90 mL/min/1 73 square meters)    Stage 2 Mild CKD (GFR = 60-89 mL/min/1 73 square meters)    Stage 3A Moderate CKD (GFR = 45-59 mL/min/1 73 square meters)    Stage 3B Moderate CKD (GFR = 30-44 mL/min/1 73 square meters)    Stage 4 Severe CKD (GFR = 15-29 mL/min/1 73 square meters)    Stage 5 End Stage CKD (GFR <15 mL/min/1 73 square meters)  Note: GFR calculation is accurate only with a steady state creatinine    CBC and differential [[id number]]  (Normal) Collected: 05/11/21 1814    Lab Status: Final result Specimen: Blood from Arm, Right Updated: 05/11/21 1822     WBC 9 53 Thousand/uL      RBC 4 36 Million/uL      Hemoglobin 13 3 g/dL      Hematocrit 38 7 %      MCV 89 fL      MCH 30 5 pg      MCHC 34 4 g/dL      RDW 12 3 %      MPV 9 4 fL      Platelets 281 Thousands/uL      Neutrophils Relative 60 %      Immat GRANS % 0 %      Lymphocytes Relative 34 %      Monocytes Relative 6 %      Eosinophils Relative 0 %      Basophils Relative 0 %      Neutrophils Absolute 5 72 Thousands/µL      Immature Grans Absolute 0 01 Thousand/uL      Lymphocytes Absolute 3 21 Thousands/µL      Monocytes Absolute 0 52 Thousand/µL      Eosinophils Absolute 0 04 Thousand/µL      Basophils Absolute 0 03 Thousands/µL                  XR chest 1 view portable   ED Interpretation by Debo Khoury PA-C (05/11 1938)   NAD                 Procedures  Procedures         ED Course                             SBIRT 20yo+      Most Recent Value   SBIRT (23 yo +)   In order to provide better care to our patients, we are screening all of our patients for alcohol and drug use  Would it be okay to ask you these screening questions? Unable to answer at this time Filed at: 05/11/2021 1819                    MDM    Disposition  Final diagnoses:   Viral illness     Time reflects when diagnosis was documented in both MDM as applicable and the Disposition within this note     Time User Action Codes Description Comment    5/11/2021  7:54 PM Ariadna Henson Add [B34 9] Viral illness       ED Disposition     ED Disposition Condition Date/Time Comment    Discharge Stable Tue May 11, 2021  7:54 PM Gregg Townsend discharge to home/self care  Follow-up Information     Follow up With Specialties Details Why Karolina Mercado MD   As needed [phone] [phone]            Patient's Medications   Discharge Prescriptions    No medications on file     No discharge procedures on file      PDMP Review     None          ED Provider  Electronically Signed by           Debo Khoury PA-C  05/11/21 1954

## 2021-05-11 NOTE — DISCHARGE INSTRUCTIONS
Use Tylenol every 4 hours or Motrin every 6 hours; you can alternate the 2 medications taking something every 3 hours for pain or fever  If no improvement follow-up with your doctor in next few days

## 2021-07-06 ENCOUNTER — TELEPHONE (OUTPATIENT)
Dept: OBGYN CLINIC | Facility: HOSPITAL | Age: 26
End: 2021-07-06

## 2021-07-06 NOTE — TELEPHONE ENCOUNTER
Patient sees Sushma Morales    Patient is having L Shoulder pain, she had surgery on it 5 yrs ago @ [facility] [address] in [facility], they were sued so they can't get her records, she would like to know if you would see the patient anyway without them?     [id number]  [address]  Patients Jaquelin Johnson - [phone]

## 2021-07-13 ENCOUNTER — HOSPITAL ENCOUNTER (OUTPATIENT)
Dept: MRI IMAGING | Facility: HOSPITAL | Age: 26
Discharge: HOME | End: 2021-07-13
Payer: COMMERCIAL

## 2021-07-13 DIAGNOSIS — M54.12 CERVICAL RADICULITIS: ICD-10-CM

## 2021-07-13 DIAGNOSIS — M25.512 LEFT SHOULDER PAIN, UNSPECIFIED CHRONICITY: ICD-10-CM

## 2021-07-13 PROCEDURE — 72141 MRI NECK SPINE W/O DYE: CPT

## 2021-07-13 PROCEDURE — G1004 CDSM NDSC: HCPCS

## 2021-07-13 PROCEDURE — 73221 MRI JOINT UPR EXTREM W/O DYE: CPT

## 2021-07-27 ENCOUNTER — OFFICE VISIT (OUTPATIENT)
Dept: OBGYN CLINIC | Facility: CLINIC | Age: 26
End: 2021-07-27
Payer: COMMERCIAL

## 2021-07-27 VITALS
HEART RATE: 87 BPM | BODY MASS INDEX: 21.31 KG/M2 | HEIGHT: 66 IN | WEIGHT: 132.6 LBS | SYSTOLIC BLOOD PRESSURE: 100 MMHG | DIASTOLIC BLOOD PRESSURE: 67 MMHG

## 2021-07-27 DIAGNOSIS — M75.82 ROTATOR CUFF TENDONITIS, LEFT: Primary | ICD-10-CM

## 2021-07-27 PROCEDURE — 99214 OFFICE O/P EST MOD 30 MIN: CPT | Performed by: ORTHOPAEDIC SURGERY

## 2021-07-27 RX ORDER — ALBUTEROL SULFATE 90 UG/1
2 AEROSOL, METERED RESPIRATORY (INHALATION) EVERY 6 HOURS PRN
COMMUNITY
Start: 2021-05-10 | End: 2022-05-10

## 2021-07-27 NOTE — PROGRESS NOTES
Assessment/Plan:  1  Rotator cuff tendonitis, left  Ambulatory referral to Physical Therapy     The patient does appear to have rotator cuff tendonitis, but no rotator cuff tear  I do not advise any surgical intervention at this time  I prescribed her physical therapy for her shoulder  We also advised ice and OTC medications as needed for discomfort  She will follow-up in 6 weeks for repeat evaluation  Subjective: ,  Robby Lynn is a 27 y o  female who presents today for evaluation of her left shoulder  She had an arthroscopy of this shoulder back in 2014 with debridement of partial RTC tear and subacromial decompression  We are able to view this operative report today  She notes that she had done well up until a month or so ago, with no inciting event prior to the onset of her symptoms  She notes pain about the lateral shoulder, but also gets pain about the axilla that radiates down to the elbow  Her pain is worse with movement and better with rest  She notes limitations in ROM as well as weakness  She also notes cracking about the shoulder  She did have an MRI of the shoulder prior to seeing us which showed rotator cuff tendonitis and bursitis, but no evidence of RTC tear  Review of Systems   Constitutional: Negative  Negative for chills and fever  HENT: Negative  Negative for ear pain and sore throat  Eyes: Negative  Negative for pain and redness  Respiratory: Negative  Negative for shortness of breath and wheezing  Cardiovascular: Negative for chest pain and palpitations  Gastrointestinal: Negative  Negative for abdominal pain and blood in stool  Endocrine: Negative  Negative for polydipsia and polyuria  Genitourinary: Negative  Negative for difficulty urinating and dysuria  Musculoskeletal:        As noted in HPI   Skin: Negative  Negative for pallor and rash  Neurological: Negative  Negative for dizziness and numbness  Hematological: Negative    Negative for adenopathy  Does not bruise/bleed easily  Psychiatric/Behavioral: Negative  Negative for confusion and suicidal ideas  History reviewed  No pertinent past medical history  Past Surgical History:   Procedure Laterality Date    CERVICAL SPINE SURGERY      SHOULDER ARTHROSCOPY Left 2014       History reviewed  No pertinent family history  Social History     Occupational History    Not on file   Tobacco Use    Smoking status: Current Every Day Smoker    Smokeless tobacco: Never Used   Vaping Use    Vaping Use: Every day   Substance and Sexual Activity    Alcohol use: Yes     Comment: socially     Drug use: Yes     Types: Marijuana    Sexual activity: Not on file         Current Outpatient Medications:     albuterol (PROVENTIL HFA,VENTOLIN HFA) 90 mcg/act inhaler, Inhale 2 puffs every 6 (six) hours as needed, Disp: , Rfl:     ibuprofen (MOTRIN) 600 mg tablet, Take by mouth every 6 (six) hours as needed for mild pain, Disp: , Rfl:     Melatonin 10 MG TABS, Take 20 mg by mouth daily at bedtime, Disp: , Rfl:     Allergies   Allergen Reactions    Aloe Vera [Lidocaine] Hives       Objective:  Vitals:    07/27/21 1004   BP: 100/67   Pulse: 87       Left Shoulder Exam     Tenderness   The patient is experiencing no tenderness  Range of Motion   Active abduction: 120 (with pain)   External rotation: 70   Forward flexion: 150 (with pain)   Internal rotation 0 degrees: T12     Muscle Strength   Abduction: 4/5   Internal rotation: 4/5   External rotation: 4/5     Tests   Drop arm: negative    Other   Erythema: absent  Sensation: normal  Pulse: present             Physical Exam  Constitutional:       General: She is not in acute distress  Appearance: She is well-developed  HENT:      Head: Normocephalic and atraumatic  Eyes:      General: No scleral icterus  Conjunctiva/sclera: Conjunctivae normal    Neck:      Vascular: No JVD  Cardiovascular:      Rate and Rhythm: Normal rate  Pulmonary:      Effort: Pulmonary effort is normal  No respiratory distress  Skin:     General: Skin is warm  Neurological:      Mental Status: She is alert and oriented to person, place, and time  Coordination: Coordination normal          I have personally reviewed pertinent films in PACS and my interpretation is as follows:  MRI left shoulder: RTC tendonitis and bursitis  No RTC tear noted

## 2021-09-09 ENCOUNTER — APPOINTMENT (EMERGENCY)
Dept: RADIOLOGY | Facility: HOSPITAL | Age: 26
End: 2021-09-09
Payer: COMMERCIAL

## 2021-09-09 ENCOUNTER — HOSPITAL ENCOUNTER (EMERGENCY)
Facility: HOSPITAL | Age: 26
Discharge: HOME | End: 2021-09-09
Attending: EMERGENCY MEDICINE | Admitting: EMERGENCY MEDICINE
Payer: COMMERCIAL

## 2021-09-09 VITALS
WEIGHT: 130 LBS | HEART RATE: 63 BPM | RESPIRATION RATE: 20 BRPM | SYSTOLIC BLOOD PRESSURE: 127 MMHG | HEIGHT: 66 IN | DIASTOLIC BLOOD PRESSURE: 75 MMHG | BODY MASS INDEX: 20.89 KG/M2 | TEMPERATURE: 98.6 F | OXYGEN SATURATION: 99 %

## 2021-09-09 DIAGNOSIS — R07.9 CHEST PAIN: Primary | ICD-10-CM

## 2021-09-09 DIAGNOSIS — F41.9 ANXIETY: ICD-10-CM

## 2021-09-09 LAB
ANION GAP SERPL CALCULATED.3IONS-SCNC: 8 MMOL/L (ref 4–13)
BASOPHILS # BLD AUTO: 0.05 THOUSANDS/ΜL (ref 0–0.1)
BASOPHILS NFR BLD AUTO: 1 % (ref 0–1)
BUN SERPL-MCNC: 9 MG/DL (ref 6–20)
CALCIUM SERPL-MCNC: 9 MG/DL (ref 8.4–10.2)
CHLORIDE SERPL-SCNC: 105 MMOL/L (ref 96–108)
CO2 SERPL-SCNC: 26 MMOL/L (ref 22–33)
CREAT SERPL-MCNC: 0.8 MG/DL (ref 0.4–1.1)
EOSINOPHIL # BLD AUTO: 0.1 THOUSAND/ΜL (ref 0–0.61)
EOSINOPHIL NFR BLD AUTO: 1 % (ref 0–6)
ERYTHROCYTE [DISTWIDTH] IN BLOOD BY AUTOMATED COUNT: 12.7 % (ref 11.6–15.1)
GFR SERPL CREATININE-BSD FRML MDRD: 101 ML/MIN/1.73SQ M
GLUCOSE SERPL-MCNC: 104 MG/DL (ref 65–140)
HCG SERPL QL: NEGATIVE
HCT VFR BLD AUTO: 38.8 % (ref 34.8–46.1)
HGB BLD-MCNC: 13.3 G/DL (ref 11.5–15.4)
IMM GRANULOCYTES # BLD AUTO: 0.01 THOUSAND/UL (ref 0–0.2)
IMM GRANULOCYTES NFR BLD AUTO: 0 % (ref 0–2)
LYMPHOCYTES # BLD AUTO: 2.47 THOUSANDS/ΜL (ref 0.6–4.47)
LYMPHOCYTES NFR BLD AUTO: 30 % (ref 14–44)
MCH RBC QN AUTO: 30.5 PG (ref 26.8–34.3)
MCHC RBC AUTO-ENTMCNC: 34.3 G/DL (ref 31.4–37.4)
MCV RBC AUTO: 89 FL (ref 82–98)
MONOCYTES # BLD AUTO: 0.59 THOUSAND/ΜL (ref 0.17–1.22)
MONOCYTES NFR BLD AUTO: 7 % (ref 4–12)
NEUTROPHILS # BLD AUTO: 5.01 THOUSANDS/ΜL (ref 1.85–7.62)
NEUTS SEG NFR BLD AUTO: 61 % (ref 43–75)
PLATELET # BLD AUTO: 307 THOUSANDS/UL (ref 149–390)
PMV BLD AUTO: 9.5 FL (ref 8.9–12.7)
POTASSIUM SERPL-SCNC: 3.9 MMOL/L (ref 3.5–5)
RBC # BLD AUTO: 4.36 MILLION/UL (ref 3.81–5.12)
SODIUM SERPL-SCNC: 139 MMOL/L (ref 133–145)
TROPONIN I SERPL-MCNC: <0.03 NG/ML (ref 0–0.07)
WBC # BLD AUTO: 8.23 THOUSAND/UL (ref 4.31–10.16)

## 2021-09-09 PROCEDURE — 99285 EMERGENCY DEPT VISIT HI MDM: CPT

## 2021-09-09 PROCEDURE — 84703 CHORIONIC GONADOTROPIN ASSAY: CPT | Performed by: EMERGENCY MEDICINE

## 2021-09-09 PROCEDURE — 99284 EMERGENCY DEPT VISIT MOD MDM: CPT | Performed by: EMERGENCY MEDICINE

## 2021-09-09 PROCEDURE — 80048 BASIC METABOLIC PNL TOTAL CA: CPT | Performed by: EMERGENCY MEDICINE

## 2021-09-09 PROCEDURE — 36415 COLL VENOUS BLD VENIPUNCTURE: CPT | Performed by: EMERGENCY MEDICINE

## 2021-09-09 PROCEDURE — 84484 ASSAY OF TROPONIN QUANT: CPT | Performed by: EMERGENCY MEDICINE

## 2021-09-09 PROCEDURE — 71045 X-RAY EXAM CHEST 1 VIEW: CPT

## 2021-09-09 PROCEDURE — 85025 COMPLETE CBC W/AUTO DIFF WBC: CPT | Performed by: EMERGENCY MEDICINE

## 2021-09-09 PROCEDURE — 96374 THER/PROPH/DIAG INJ IV PUSH: CPT

## 2021-09-09 PROCEDURE — 93005 ELECTROCARDIOGRAM TRACING: CPT

## 2021-09-09 RX ORDER — SODIUM CHLORIDE 9 MG/ML
3 INJECTION INTRAVENOUS
Status: DISCONTINUED | OUTPATIENT
Start: 2021-09-09 | End: 2021-09-09 | Stop reason: HOSPADM

## 2021-09-09 RX ORDER — KETOROLAC TROMETHAMINE 30 MG/ML
15 INJECTION, SOLUTION INTRAMUSCULAR; INTRAVENOUS ONCE
Status: COMPLETED | OUTPATIENT
Start: 2021-09-09 | End: 2021-09-09

## 2021-09-09 RX ORDER — HYDROXYZINE HYDROCHLORIDE 25 MG/1
25 TABLET, FILM COATED ORAL EVERY 6 HOURS PRN
Qty: 20 TABLET | Refills: 0 | Status: SHIPPED | OUTPATIENT
Start: 2021-09-09 | End: 2021-09-09 | Stop reason: SDUPTHER

## 2021-09-09 RX ORDER — HYDROXYZINE HYDROCHLORIDE 25 MG/1
25 TABLET, FILM COATED ORAL EVERY 6 HOURS PRN
Qty: 20 TABLET | Refills: 0 | Status: SHIPPED | OUTPATIENT
Start: 2021-09-09

## 2021-09-09 RX ADMIN — KETOROLAC TROMETHAMINE 15 MG: 30 INJECTION, SOLUTION INTRAMUSCULAR; INTRAVENOUS at 20:13

## 2021-09-10 NOTE — ED PROVIDER NOTES
History  Chief Complaint   Patient presents with    Chest Pain     Pt states chest pain since having possible panic attack this morning  Also dizzy, lightheaded, nauseated  Admits to stressors in life  Pain has been intermittent for 2-3 months, but never this bad  78-year-old female previous history of tobacco abuse presents emergency department chest pain  Located sternum  Constant since this morning after she had a panic attack  Patient notes panic attack episode consisted of feeling lightheaded, lowering herself to the ground, vomiting once, feeling off since this episode  Patient has had similar episodes in the past   Vomit was nonbloody  Chest pain started just prior episode of vomiting  Cardiac risk factor of tobacco use only  Patient was adopted  No history of hypertension, hyperlipidemia, diabetes  No history of DVT or PE  No recent travel or hospitalization  Denies lower extremity edema, estrogen supplementation or hemoptysis  Chest Pain  Pain location:  Substernal area  Pain quality: dull and sharp    Pain radiates to:  Does not radiate  Pain radiates to the back: no    Pain severity:  Moderate  Onset quality:  Gradual  Timing:  Constant  Progression:  Unchanged  Chronicity:  New  Context: not breathing    Relieved by:  Nothing  Worsened by:  Nothing tried  Ineffective treatments:  None tried  Associated symptoms: no abdominal pain        Prior to Admission Medications   Prescriptions Last Dose Informant Patient Reported? Taking? Melatonin 10 MG TABS   Yes No   Sig: Take 20 mg by mouth daily at bedtime   albuterol (PROVENTIL HFA,VENTOLIN HFA) 90 mcg/act inhaler   Yes No   Sig: Inhale 2 puffs every 6 (six) hours as needed   ibuprofen (MOTRIN) 600 mg tablet   Yes No   Sig: Take by mouth every 6 (six) hours as needed for mild pain      Facility-Administered Medications: None       History reviewed  No pertinent past medical history      Past Surgical History:   Procedure Laterality Date    CERVICAL SPINE SURGERY      SHOULDER ARTHROSCOPY Left 2014       History reviewed  No pertinent family history  I have reviewed and agree with the history as documented  E-Cigarette/Vaping    E-Cigarette Use Current Every Day User      E-Cigarette/Vaping Substances    Nicotine No     THC No     CBD No     Flavoring No     Other No     Unknown No      Social History     Tobacco Use    Smoking status: Current Every Day Smoker    Smokeless tobacco: Never Used   Vaping Use    Vaping Use: Every day   Substance Use Topics    Alcohol use: Yes     Comment: socially     Drug use: Yes     Types: Marijuana       Review of Systems   Cardiovascular: Positive for chest pain  Gastrointestinal: Negative for abdominal pain  Neurological: Positive for light-headedness  All other systems reviewed and are negative  Physical Exam  Physical Exam  Vitals and nursing note reviewed  Constitutional:       General: She is not in acute distress  Appearance: Normal appearance  She is not ill-appearing  HENT:      Head: Normocephalic and atraumatic  Right Ear: External ear normal       Left Ear: External ear normal       Nose: Nose normal       Mouth/Throat:      Mouth: Mucous membranes are moist    Eyes:      General:         Right eye: No discharge  Left eye: No discharge  Conjunctiva/sclera: Conjunctivae normal    Cardiovascular:      Rate and Rhythm: Normal rate and regular rhythm  Pulses: Normal pulses  Heart sounds: No murmur heard  Pulmonary:      Effort: Pulmonary effort is normal       Breath sounds: Normal breath sounds  Abdominal:      General: Abdomen is flat  There is no distension  Tenderness: There is no abdominal tenderness  Musculoskeletal:         General: Normal range of motion  Cervical back: Normal range of motion  Skin:     General: Skin is warm  Capillary Refill: Capillary refill takes less than 2 seconds  Findings: No rash  Neurological:      General: No focal deficit present  Mental Status: She is alert  Mental status is at baseline     Psychiatric:         Mood and Affect: Mood normal          Behavior: Behavior normal          Vital Signs  ED Triage Vitals   Temperature Pulse Respirations Blood Pressure SpO2   09/09/21 1948 09/09/21 2005 09/09/21 2005 09/09/21 2005 09/09/21 2005   98 6 °F (37 °C) 63 20 127/75 99 %      Temp src Heart Rate Source Patient Position - Orthostatic VS BP Location FiO2 (%)   -- 09/09/21 2005 09/09/21 2005 09/09/21 2005 --    Monitor Lying Left arm       Pain Score       09/09/21 1948       5           Vitals:    09/09/21 2005   BP: 127/75   Pulse: 63   Patient Position - Orthostatic VS: Lying         Visual Acuity      ED Medications  Medications   sodium chloride (PF) 0 9 % injection 3 mL (has no administration in time range)   ketorolac (TORADOL) injection 15 mg (15 mg Intravenous Given 9/9/21 2013)       Diagnostic Studies  Results Reviewed     Procedure Component Value Units Date/Time    hCG, qualitative pregnancy [[id number]]  (Normal) Collected: 09/09/21 2013    Lab Status: Final result Specimen: Blood from Arm, Left Updated: 09/09/21 2045     Preg, Serum Negative    Troponin I [[id number]]  (Normal) Collected: 09/09/21 2013    Lab Status: Final result Specimen: Blood from Arm, Left Updated: 09/09/21 2040     Troponin I <0 03 ng/mL     Basic metabolic panel [[id number]] Collected: 09/09/21 2013    Lab Status: Final result Specimen: Blood from Arm, Left Updated: 09/09/21 2038     Sodium 139 mmol/L      Potassium 3 9 mmol/L      Chloride 105 mmol/L      CO2 26 mmol/L      ANION GAP 8 mmol/L      BUN 9 mg/dL      Creatinine 0 80 mg/dL      Glucose 104 mg/dL      Calcium 9 0 mg/dL      eGFR 101 ml/min/1 73sq m     Narrative:      Jaki guidelines for Chronic Kidney Disease (CKD):     Stage 1 with normal or high GFR (GFR > 90 mL/min/1 73 square meters)    Stage 2 Mild CKD (GFR = 60-89 mL/min/1 73 square meters)    Stage 3A Moderate CKD (GFR = 45-59 mL/min/1 73 square meters)    Stage 3B Moderate CKD (GFR = 30-44 mL/min/1 73 square meters)    Stage 4 Severe CKD (GFR = 15-29 mL/min/1 73 square meters)    Stage 5 End Stage CKD (GFR <15 mL/min/1 73 square meters)  Note: GFR calculation is accurate only with a steady state creatinine    CBC and differential [[id number]]  (Normal) Collected: 09/09/21 2013    Lab Status: Final result Specimen: Blood from Arm, Left Updated: 09/09/21 2029     WBC 8 23 Thousand/uL      RBC 4 36 Million/uL      Hemoglobin 13 3 g/dL      Hematocrit 38 8 %      MCV 89 fL      MCH 30 5 pg      MCHC 34 3 g/dL      RDW 12 7 %      MPV 9 5 fL      Platelets 071 Thousands/uL      Neutrophils Relative 61 %      Immat GRANS % 0 %      Lymphocytes Relative 30 %      Monocytes Relative 7 %      Eosinophils Relative 1 %      Basophils Relative 1 %      Neutrophils Absolute 5 01 Thousands/µL      Immature Grans Absolute 0 01 Thousand/uL      Lymphocytes Absolute 2 47 Thousands/µL      Monocytes Absolute 0 59 Thousand/µL      Eosinophils Absolute 0 10 Thousand/µL      Basophils Absolute 0 05 Thousands/µL                  X-ray chest 1 view portable   ED Interpretation by Kandy Mccain DO (09/09 2104)   No acute cardiopulmonary process  Procedures  Procedures         ED Course             HEART Risk Score      Most Recent Value   Heart Score Risk Calculator   History  0 Filed at: 09/09/2021 2051   ECG  0 Filed at: 09/09/2021 2051   Age  0 Filed at: 09/09/2021 2051   Risk Factors  1 Filed at: 09/09/2021 2051   Troponin  0 Filed at: 09/09/2021 2051   HEART Score  1 Filed at: 09/09/2021 2051                      SBIRT 22yo+      Most Recent Value   SBIRT (23 yo +)   In order to provide better care to our patients, we are screening all of our patients for alcohol and drug use   Would it be okay to ask you these screening questions? No Filed at: 09/09/2021 1949                    [facility]  Number of Diagnoses or Management Options  Anxiety: new and requires workup  Chest pain: new and requires workup  Diagnosis management comments: Well-appearing 49-year-old female  Believe she had a panic attack  Chest pain constant since this morning  One of his vomiting  Nonbloody nonbilious  Low suspicion for ACS but will rule out  Will evaluate for acute intrathoracic process  Patient is PERC negative  No suspicion for DVT or PE  Blood work without acute findings  Chest x-ray with no acute findings per my read  Patient be discharged home  Patient given Atarax for anxiety  Follow-up with primary care provider soon as possible  Return precautions for new or worsening pain, new worsening symptoms  Amount and/or Complexity of Data Reviewed  Clinical lab tests: ordered and reviewed  Tests in the radiology section of CPT®: ordered and reviewed  Review and summarize past medical records: yes  Discuss the patient with other providers: yes  Independent visualization of images, tracings, or specimens: yes    Risk of Complications, Morbidity, and/or Mortality  Presenting problems: high  Diagnostic procedures: high  Management options: high    Patient Progress  Patient progress: stable      Disposition  Final diagnoses:   Chest pain   Anxiety     Time reflects when diagnosis was documented in both MDM as applicable and the Disposition within this note     Time User Action Codes Description Comment    9/9/2021  8:51 PM Eulis Saas Add [R07 9] Chest pain     9/9/2021  9:29 PM Eulis Saas Add [F41 9] Anxiety       ED Disposition     ED Disposition Condition Date/Time Comment    Discharge Stable Thu Sep 9, 2021  9:06 PM Robby Lynn discharge to home/self care              Follow-up Information     Follow up With Specialties Details Why Contact Info Additional Clyde Echavarria MD  In 3 days for re-evaluation [phone] [address] [facility],[address] [place] [address] Emergency Department Emergency Medicine  If symptoms worsen [address] [facility],[address] [phone]  [facility] Emergency Department, [address], [address]          Discharge Medication List as of 9/9/2021  9:31 PM      START taking these medications    Details   hydrOXYzine HCL (ATARAX) 25 mg tablet Take 1 tablet (25 mg total) by mouth every 6 (six) hours as needed for anxiety for up to 20 doses, Starting Thu 9/9/2021, Normal         CONTINUE these medications which have NOT CHANGED    Details   albuterol (PROVENTIL HFA,VENTOLIN HFA) 90 mcg/act inhaler Inhale 2 puffs every 6 (six) hours as needed, Starting Mon 5/10/2021, Until Tue 5/10/2022 at 2359, Historical Med      ibuprofen (MOTRIN) 600 mg tablet Take by mouth every 6 (six) hours as needed for mild pain, Historical Med      Melatonin 10 MG TABS Take 20 mg by mouth daily at bedtime, Historical Med           No discharge procedures on file      [address] Review     None          ED Provider  Electronically Signed by           Zeeshan Meza DO  09/09/21 1389

## 2021-09-10 NOTE — DISCHARGE INSTRUCTIONS
Follow-up with primary care provider soon as possible  Come back to the emergency department if you have new or worsening pain, worsening symptoms  Take the hydroxyzine every 6 hours as needed for anxiety  Look into counseling resources

## 2021-09-16 LAB
ATRIAL RATE: 59 BPM
P AXIS: 38 DEGREES
PR INTERVAL: 148 MS
QRS AXIS: 72 DEGREES
QRSD INTERVAL: 86 MS
QT INTERVAL: 432 MS
QTC INTERVAL: 428 MS
T WAVE AXIS: 44 DEGREES
VENTRICULAR RATE: 59 BPM

## 2021-09-16 PROCEDURE — 93010 ELECTROCARDIOGRAM REPORT: CPT | Performed by: INTERNAL MEDICINE

## 2023-02-06 NOTE — ED PROVIDER NOTES
History  Chief Complaint   Patient presents with    Chest Pain     Pt reports sharp CP for about 3 hours  She also reports vomiting and feeling stressed  Patient is a 18-year-old female who presents to the emergency department relating that she has been having sharp chest pain and on and off shortness of breath    She gestures to the center chest as the site of discomfort  She relates that this started while she was seated in waiting area of the hospital   She notes that her wife was having an endoscopy performed  She has had a constant ache sensation in this area with intermittent intensification  She does not notice anything that intensifies this  She was more short of breath earlier and now notes her breathing is normal   She did have a few episodes of vomiting as well  No lightheadedness, dizziness or palpitations  No cough  No recent illness or injury  Patient relates that she has been under a great deal of stress recently with the death a family in the last couple of weeks and now anxiety this procedure  She thought the symptoms might have been a panic attack but as they were lasting so long she decided to seek medical attention  She is healthy at baseline without any known history of cardiac, pulmonary or coagulopathy problems  Family history is unknown patient relates that she is adopted  She does take an oral contraceptive to regulate her menses  No recent immobilization  None       History reviewed  No pertinent past medical history  Past Surgical History:   Procedure Laterality Date    CERVICAL SPINE SURGERY         History reviewed  No pertinent family history  I have reviewed and agree with the history as documented  Social History   Substance Use Topics    Smoking status: Former Smoker    Smokeless tobacco: Never Used    Alcohol use Yes      Comment: socially         Review of Systems   Constitutional: Negative for fever     Cardiovascular: Negative for leg swelling  All other systems reviewed and are negative  Physical Exam  Physical Exam   Constitutional: She is oriented to person, place, and time  She appears well-developed and well-nourished  Eyes: Conjunctivae and EOM are normal    Cardiovascular: Normal rate and regular rhythm  Pulmonary/Chest: Effort normal and breath sounds normal  She exhibits tenderness (Mid sternum)  Abdominal: Soft  Bowel sounds are normal  There is tenderness  There is no guarding  Musculoskeletal: Normal range of motion  She exhibits no edema or tenderness  No posterior thoracic tenderness  Neurological: She is alert and oriented to person, place, and time  Skin: Skin is warm and dry  Psychiatric: She has a normal mood and affect  Her behavior is normal    Nursing note and vitals reviewed        Vital Signs  ED Triage Vitals   Temperature Pulse Respirations Blood Pressure SpO2   01/08/19 1147 01/08/19 1145 01/08/19 1145 01/08/19 1147 01/08/19 1145   98 8 °F (37 1 °C) 65 18 150/63 100 %      Temp Source Heart Rate Source Patient Position - Orthostatic VS BP Location FiO2 (%)   01/08/19 1147 01/08/19 1145 01/08/19 1147 01/08/19 1147 --   Oral Monitor Sitting Left arm       Pain Score       01/08/19 1145       7           Vitals:    01/08/19 1147 01/08/19 1400 01/08/19 1429 01/08/19 1445   BP: 150/63  124/79 124/79   Pulse:  68 66 72   Patient Position - Orthostatic VS: Sitting  Lying        Visual Acuity      ED Medications  Medications   aluminum-magnesium hydroxide-simethicone (MYLANTA) 200-200-20 mg/5 mL oral suspension 30 mL (30 mL Oral Given 1/8/19 1347)   lidocaine viscous (XYLOCAINE) 2 % mucosal solution 10 mL (10 mL Swish & Swallow Given 1/8/19 1347)       Diagnostic Studies  Results Reviewed     Procedure Component Value Units Date/Time    Comprehensive metabolic panel [[id number]]  (Abnormal) Collected:  01/08/19 1356    Lab Status:  Final result Specimen:  Blood from Arm, Right Updated:  01/08/19 7962 Sodium 139 mmol/L      Potassium 4 8 mmol/L      Chloride 105 mmol/L      CO2 24 mmol/L      ANION GAP 10 mmol/L      BUN 10 mg/dL      Creatinine 0 74 mg/dL      Glucose 111 mg/dL      Calcium 8 4 mg/dL      AST 35 U/L      ALT 25 U/L      Alkaline Phosphatase 43 (L) U/L      Total Protein 7 2 g/dL      Albumin 3 9 g/dL      Total Bilirubin 0 40 mg/dL      eGFR 113 ml/min/1 73sq m     Narrative:         National Kidney Disease Education Program recommendations are as follows:  GFR calculation is accurate only with a steady state creatinine  Chronic Kidney disease less than 60 ml/min/1 73 sq  meters  Kidney failure less than 15 ml/min/1 73 sq  meters      Troponin I [[id number]]  (Normal) Collected:  01/08/19 1356    Lab Status:  Final result Specimen:  Blood from Arm, Right Updated:  01/08/19 1422     Troponin I <0 02 ng/mL     Lipase [[id number]]  (Normal) Collected:  01/08/19 1356    Lab Status:  Final result Specimen:  Blood from Arm, Right Updated:  01/08/19 1420     Lipase 88 u/L     D-Dimer [[id number]]  (Normal) Collected:  01/08/19 1356    Lab Status:  Final result Specimen:  Blood from Arm, Right Updated:  01/08/19 1416     D-Dimer, Quant <270 ng/ml (FEU)     CBC and differential [[id number]] Collected:  01/08/19 1356    Lab Status:  Final result Specimen:  Blood from Arm, Right Updated:  01/08/19 1402     WBC 8 36 Thousand/uL      RBC 4 26 Million/uL      Hemoglobin 12 9 g/dL      Hematocrit 38 3 %      MCV 90 fL      MCH 30 3 pg      MCHC 33 7 g/dL      RDW 12 5 %      MPV 9 4 fL      Platelets 972 Thousands/uL      nRBC 0 /100 WBCs      Neutrophils Relative 65 %      Immat GRANS % 0 %      Lymphocytes Relative 26 %      Monocytes Relative 7 %      Eosinophils Relative 1 %      Basophils Relative 1 %      Neutrophils Absolute 5 51 Thousands/µL      Immature Grans Absolute 0 01 Thousand/uL      Lymphocytes Absolute 2 15 Thousands/µL      Monocytes Absolute 0 61 Thousand/µL      Eosinophils Absolute 0 04 Thousand/µL      Basophils Absolute 0 04 Thousands/µL                  XR chest 2 views   ED Interpretation by Ashley Austin MD (01/08 1457)   Unremarkable cardiac silhouette  Clear lungs  Final Result by Rafi Yu MD (01/08 1941)      No acute cardiopulmonary disease  Workstation performed: [id number]                    Procedures  ECG 12 Lead Documentation  Date/Time: 1/8/2019 1:53 PM  Performed by: Jp Angulo  Authorized by: Jp MEANS     Rate:     ECG rate:  60    ECG rate assessment: normal    Rhythm:     Rhythm: sinus rhythm    Ectopy:     Ectopy: none    QRS:     QRS axis:  Normal  Conduction:     Conduction: normal    ST segments:     ST segments:  Normal  T waves:     T waves: normal             Phone Contacts  ED Phone Contact    ED Course  ED Course as of Jan 09 1504   Tue Jan 08, 2019   1519 Patient feeling well at this time  She did notice some slight improvement in the chest discomfort with GI cocktail and notices minimal lingering pain  She remains very mildly tender to palpation  Nausea has fully resolved  Do suspect that discomfort is multifactorial   A component is clearly musculoskeletal and a portion related to the reflux  Stress is a possible trigger to all of the symptoms  With these having been very abrupt in onset today discussed plan to prescribe and antiemetic to use if needed for return of nausea and recommendation to adhere to a bland diet over the next few days  If she continues having discomfort or other symptoms she should follow up with her PCP  She is aware to return if she has significant worsening/new concerns                  PERC Rule for PE      Most Recent Value   PERC Rule for PE   Age >=50  0 Filed at: 01/08/2019 1412   HR >=100  0 Filed at: 01/08/2019 1412   O2 Sat on room air < 95%  0 Filed at: 01/08/2019 1412   History of PE or DVT  0 Filed at: 01/08/2019 1412   Recent trauma or surgery  0 Filed at: 01/08/2019 1412   Hemoptysis  0 Filed at: 01/08/2019 1412   Exogenous estrogen  1 Filed at: 01/08/2019 1412   Unilateral leg swelling  0 Filed at: 01/08/2019 1412   PERC Rule for PE Results  1 Filed at: 01/08/2019 1412                      MDM  CritCare Time    Disposition  Final diagnoses:   Epigastric pain   Nausea & vomiting     Time reflects when diagnosis was documented in both MDM as applicable and the Disposition within this note     Time User Action Codes Description Comment    1/8/2019  3:21 PM Norlene Marker A Add [R10 13] Epigastric pain     1/8/2019  3:21 PM Norlene Marker A Add [R11 2] Nausea & vomiting       ED Disposition     ED Disposition Condition Comment    Discharge  Colby Andrews discharge to home/self care  Condition at discharge: Good        Follow-up Information     Follow up With Specialties Details Why Contact Info Additional [address] [facility]  Schedule an appointment as soon as possible for a visit If symptoms continue beyond the next couple of days [address] Emergency Department Emergency Medicine  As needed, If symptoms worsen [address] [facility]  [facility] ED, [address], [place], [place], [address]          Discharge Medication List as of 1/8/2019  3:22 PM      START taking these medications    Details   ondansetron (ZOFRAN-ODT) 4 mg disintegrating tablet Take 1 tablet (4 mg total) by mouth every 6 (six) hours as needed for nausea or vomiting, Starting Tue 1/8/2019, Print           No discharge procedures on file      ED Provider  Electronically Signed by           Leticia Ovalle MD  01/08/19 1606       Leticia Ovalle MD  01/09/19 8285 Secondary Defect Length In Cm (Required For Flaps): 2.5

## 2023-06-27 ENCOUNTER — APPOINTMENT (OUTPATIENT)
Dept: RADIOLOGY | Facility: CLINIC | Age: 28
End: 2023-06-27
Payer: COMMERCIAL

## 2023-06-27 ENCOUNTER — OFFICE VISIT (OUTPATIENT)
Dept: OBGYN CLINIC | Facility: CLINIC | Age: 28
End: 2023-06-27
Payer: COMMERCIAL

## 2023-06-27 VITALS
TEMPERATURE: 98.2 F | HEART RATE: 68 BPM | WEIGHT: 132 LBS | DIASTOLIC BLOOD PRESSURE: 75 MMHG | SYSTOLIC BLOOD PRESSURE: 126 MMHG | BODY MASS INDEX: 21.31 KG/M2

## 2023-06-27 DIAGNOSIS — M79.644 PAIN OF RIGHT THUMB: ICD-10-CM

## 2023-06-27 DIAGNOSIS — M79.644 PAIN OF RIGHT THUMB: Primary | ICD-10-CM

## 2023-06-27 PROCEDURE — 73130 X-RAY EXAM OF HAND: CPT

## 2023-06-27 PROCEDURE — 99214 OFFICE O/P EST MOD 30 MIN: CPT | Performed by: PHYSICIAN ASSISTANT

## 2023-06-27 NOTE — PROGRESS NOTES
Assessment/Plan:  1  Pain of right thumb  XR hand 3+ vw right        The patient has diffuse pain about the thumb without any specific injury  She has no significant arthritic changes on xrays today  This certainly could be a sprain or tendonitis of this thumb, however I would also like to rule out a rheumatologic source of this pain  I did order some basic rheumatologic blood work today  If any of this is abnormal, we discussed referral to rheumatology  I also provided her with a comfort cool brace today for her thumb  I will call her with her bloodwork results  If she fails to make progress with bracing, and all bloodwork is normal, we can consider occupational therapy for this digit  Subjective:   Corrina Sánchez is a 25 y o  female who presents today for evaluation of her right thumb  She notes recent on set of diffuse thumb pain that started a week or so ago  She denies ay specific injury, but does not pushing a cough to move it  She did not recall injuring the thumb when doing this though  She notes pain about the CMC, MCP and IP joints  This is worse with activity and grasping things, and better with rest  She denies any swelling about the digit and also denies any triggering  She notes good sensation of the right upper extremity  She has no history of rheumatologic conditions  The patient is adopted so she is unsure if there is any family history of rheumtaoid arthritis  Review of Systems   Constitutional: Negative  Negative for chills and fever  HENT: Negative  Negative for ear pain and sore throat  Eyes: Negative  Negative for pain and redness  Respiratory: Negative  Negative for shortness of breath and wheezing  Cardiovascular: Negative for chest pain and palpitations  Gastrointestinal: Negative  Negative for abdominal pain and blood in stool  Endocrine: Negative  Negative for polydipsia and polyuria  Genitourinary: Negative  Negative for difficulty urinating and dysuria  Musculoskeletal:        As noted in HPI   Skin: Negative  Negative for pallor and rash  Neurological: Negative  Negative for dizziness and numbness  Hematological: Negative  Negative for adenopathy  Does not bruise/bleed easily  Psychiatric/Behavioral: Negative  Negative for confusion and suicidal ideas  History reviewed  No pertinent past medical history  Past Surgical History:   Procedure Laterality Date   • CERVICAL SPINE SURGERY     • SHOULDER ARTHROSCOPY Left 2014       History reviewed  No pertinent family history  Social History     Occupational History   • Not on file   Tobacco Use   • Smoking status: Every Day   • Smokeless tobacco: Never   Vaping Use   • Vaping Use: Every day   Substance and Sexual Activity   • Alcohol use: Yes     Comment: socially    • Drug use: Yes     Types: Marijuana   • Sexual activity: Not on file         Current Outpatient Medications:   •  ibuprofen (MOTRIN) 600 mg tablet, Take by mouth every 6 (six) hours as needed for mild pain, Disp: , Rfl:   •  Melatonin 10 MG TABS, Take 20 mg by mouth daily at bedtime, Disp: , Rfl:   •  hydrOXYzine HCL (ATARAX) 25 mg tablet, Take 1 tablet (25 mg total) by mouth every 6 (six) hours as needed for anxiety for up to 20 doses (Patient not taking: Reported on 6/27/2023), Disp: 20 tablet, Rfl: 0    Allergies   Allergen Reactions   • Aloe Vera [Lidocaine] Hives       Objective:  Vitals:    06/27/23 1112   BP: 126/75   Pulse: 68   Temp: 98 2 °F (36 8 °C)     Pain Score:   8      Ortho Exam    Physical Exam  Constitutional:       General: She is not in acute distress  Appearance: She is well-developed  HENT:      Head: Normocephalic and atraumatic  Eyes:      General: No scleral icterus  Conjunctiva/sclera: Conjunctivae normal    Neck:      Vascular: No JVD  Cardiovascular:      Rate and Rhythm: Normal rate  Pulmonary:      Effort: Pulmonary effort is normal  No respiratory distress     Skin:     General: Skin is warm  Neurological:      Mental Status: She is alert and oriented to person, place, and time  Coordination: Coordination normal      Right thumb: Benign appearance  No swelling or erythema  Diffuse tenderness IP, MCP and CMC joints of the thumb  Near full ROM of the digit without evidence of triggering  Sensation intact  2+radial pulse  I have personally reviewed pertinent films in PACS and my interpretation is as follows:  Xrays right thumb: No acute osseous abnormality  This document was created using speech voice recognition software  Grammatical errors, random word insertions, pronoun errors, and incomplete sentences are an occasional consequence of this system due to software limitations, ambient noise, and hardware issues  Any formal questions or concerns about content, text, or information contained within the body of this dictation should be directly addressed to the provider for clarification